# Patient Record
Sex: MALE | Race: WHITE | Employment: OTHER | URBAN - METROPOLITAN AREA
[De-identification: names, ages, dates, MRNs, and addresses within clinical notes are randomized per-mention and may not be internally consistent; named-entity substitution may affect disease eponyms.]

---

## 2017-06-26 ENCOUNTER — OFFICE VISIT (OUTPATIENT)
Dept: FAMILY MEDICINE CLINIC | Age: 57
End: 2017-06-26
Payer: COMMERCIAL

## 2017-06-26 VITALS
DIASTOLIC BLOOD PRESSURE: 76 MMHG | WEIGHT: 193.3 LBS | HEART RATE: 66 BPM | OXYGEN SATURATION: 98 % | TEMPERATURE: 97.8 F | SYSTOLIC BLOOD PRESSURE: 118 MMHG | HEIGHT: 72 IN | BODY MASS INDEX: 26.18 KG/M2

## 2017-06-26 DIAGNOSIS — Z23 NEED FOR TDAP VACCINATION: ICD-10-CM

## 2017-06-26 DIAGNOSIS — R25.1 TREMOR: ICD-10-CM

## 2017-06-26 DIAGNOSIS — Z00.00 ANNUAL PHYSICAL EXAM: Primary | ICD-10-CM

## 2017-06-26 DIAGNOSIS — Z12.5 SCREENING FOR PROSTATE CANCER: ICD-10-CM

## 2017-06-26 DIAGNOSIS — E78.5 HYPERLIPIDEMIA, UNSPECIFIED HYPERLIPIDEMIA TYPE: ICD-10-CM

## 2017-06-26 DIAGNOSIS — R05.9 COUGH: ICD-10-CM

## 2017-06-26 DIAGNOSIS — Z13.9 SCREENING: ICD-10-CM

## 2017-06-26 PROCEDURE — 99214 OFFICE O/P EST MOD 30 MIN: CPT | Performed by: PHYSICIAN ASSISTANT

## 2017-06-26 ASSESSMENT — PATIENT HEALTH QUESTIONNAIRE - PHQ9
SUM OF ALL RESPONSES TO PHQ9 QUESTIONS 1 & 2: 0
1. LITTLE INTEREST OR PLEASURE IN DOING THINGS: 0
SUM OF ALL RESPONSES TO PHQ QUESTIONS 1-9: 0
2. FEELING DOWN, DEPRESSED OR HOPELESS: 0

## 2017-06-26 ASSESSMENT — ENCOUNTER SYMPTOMS
VOMITING: 0
SINUS PRESSURE: 0
NAUSEA: 0
EYE ITCHING: 0
EYE DISCHARGE: 0
DIARRHEA: 0
RHINORRHEA: 0
SHORTNESS OF BREATH: 0
SORE THROAT: 0
ABDOMINAL PAIN: 0
COUGH: 1
CHEST TIGHTNESS: 0

## 2017-06-27 ENCOUNTER — HOSPITAL ENCOUNTER (OUTPATIENT)
Dept: GENERAL RADIOLOGY | Facility: CLINIC | Age: 57
Discharge: HOME | End: 2017-06-27
Payer: COMMERCIAL

## 2017-06-27 ENCOUNTER — HOSPITAL ENCOUNTER (OUTPATIENT)
Facility: CLINIC | Age: 57
Discharge: HOME | End: 2017-06-27
Payer: COMMERCIAL

## 2017-06-27 ENCOUNTER — HOSPITAL ENCOUNTER (OUTPATIENT)
Age: 57
Discharge: HOME | End: 2017-06-27
Payer: COMMERCIAL

## 2017-06-27 DIAGNOSIS — R05.9 COUGH: ICD-10-CM

## 2017-06-27 DIAGNOSIS — R25.1 TREMOR: ICD-10-CM

## 2017-06-27 DIAGNOSIS — Z13.9 SCREENING: ICD-10-CM

## 2017-06-27 DIAGNOSIS — Z12.5 SCREENING FOR PROSTATE CANCER: ICD-10-CM

## 2017-06-27 DIAGNOSIS — Z00.00 ANNUAL PHYSICAL EXAM: ICD-10-CM

## 2017-06-27 DIAGNOSIS — E78.5 HYPERLIPIDEMIA, UNSPECIFIED HYPERLIPIDEMIA TYPE: ICD-10-CM

## 2017-06-27 LAB
ABSOLUTE EOS #: 0.1 K/UL (ref 0–0.4)
ABSOLUTE LYMPH #: 1.3 K/UL (ref 1–4.8)
ABSOLUTE MONO #: 0.3 K/UL (ref 0.1–1.2)
ALBUMIN SERPL-MCNC: 4.6 G/DL (ref 3.5–5.2)
ALBUMIN/GLOBULIN RATIO: 2 (ref 1–2.5)
ALP BLD-CCNC: 53 U/L (ref 40–129)
ALT SERPL-CCNC: 23 U/L (ref 5–41)
ANION GAP SERPL CALCULATED.3IONS-SCNC: 13 MMOL/L (ref 9–17)
AST SERPL-CCNC: 23 U/L
BASOPHILS # BLD: 0 %
BASOPHILS ABSOLUTE: 0 K/UL (ref 0–0.2)
BILIRUB SERPL-MCNC: 1.4 MG/DL (ref 0.3–1.2)
BUN BLDV-MCNC: 16 MG/DL (ref 6–20)
BUN/CREAT BLD: ABNORMAL (ref 9–20)
C-REACTIVE PROTEIN: 0.6 MG/L (ref 0–5)
CALCIUM SERPL-MCNC: 9.2 MG/DL (ref 8.6–10.4)
CHLORIDE BLD-SCNC: 104 MMOL/L (ref 98–107)
CHOLESTEROL/HDL RATIO: 2.8
CHOLESTEROL: 201 MG/DL
CO2: 24 MMOL/L (ref 20–31)
CREAT SERPL-MCNC: 1.03 MG/DL (ref 0.7–1.2)
DIFFERENTIAL TYPE: NORMAL
EOSINOPHILS RELATIVE PERCENT: 2 %
GFR AFRICAN AMERICAN: >60 ML/MIN
GFR NON-AFRICAN AMERICAN: >60 ML/MIN
GFR SERPL CREATININE-BSD FRML MDRD: ABNORMAL ML/MIN/{1.73_M2}
GFR SERPL CREATININE-BSD FRML MDRD: ABNORMAL ML/MIN/{1.73_M2}
GLUCOSE BLD-MCNC: 105 MG/DL (ref 70–99)
HCT VFR BLD CALC: 43.7 % (ref 41–53)
HDLC SERPL-MCNC: 73 MG/DL
HEMOGLOBIN: 15.1 G/DL (ref 13.5–17.5)
HEPATITIS C ANTIBODY: NONREACTIVE
HIV AG/AB: NONREACTIVE
LDL CHOLESTEROL: 110 MG/DL (ref 0–130)
LYMPHOCYTES # BLD: 28 %
MCH RBC QN AUTO: 30.8 PG (ref 26–34)
MCHC RBC AUTO-ENTMCNC: 34.5 G/DL (ref 31–37)
MCV RBC AUTO: 89.3 FL (ref 80–100)
MONOCYTES # BLD: 6 %
PDW BLD-RTO: 13.8 % (ref 12.5–15.4)
PLATELET # BLD: 225 K/UL (ref 140–450)
PLATELET ESTIMATE: NORMAL
PMV BLD AUTO: 8.5 FL (ref 6–12)
POTASSIUM SERPL-SCNC: 4.3 MMOL/L (ref 3.7–5.3)
PROSTATE SPECIFIC ANTIGEN: 0.76 UG/L
RBC # BLD: 4.89 M/UL (ref 4.5–5.9)
RBC # BLD: NORMAL 10*6/UL
SEDIMENTATION RATE, ERYTHROCYTE: 1 MM (ref 0–10)
SEG NEUTROPHILS: 64 %
SEGMENTED NEUTROPHILS ABSOLUTE COUNT: 2.8 K/UL (ref 1.8–7.7)
SODIUM BLD-SCNC: 141 MMOL/L (ref 135–144)
TOTAL PROTEIN: 6.9 G/DL (ref 6.4–8.3)
TRIGL SERPL-MCNC: 90 MG/DL
TSH SERPL DL<=0.05 MIU/L-ACNC: 1.59 MIU/L (ref 0.3–5)
VITAMIN D 25-HYDROXY: 38.8 NG/ML (ref 30–100)
VLDLC SERPL CALC-MCNC: ABNORMAL MG/DL (ref 1–30)
WBC # BLD: 4.4 K/UL (ref 3.5–11)
WBC # BLD: NORMAL 10*3/UL

## 2017-06-27 PROCEDURE — 85025 COMPLETE CBC W/AUTO DIFF WBC: CPT

## 2017-06-27 PROCEDURE — 80061 LIPID PANEL: CPT

## 2017-06-27 PROCEDURE — 86140 C-REACTIVE PROTEIN: CPT

## 2017-06-27 PROCEDURE — 36415 COLL VENOUS BLD VENIPUNCTURE: CPT

## 2017-06-27 PROCEDURE — 71020 XR CHEST STANDARD TWO VW: CPT

## 2017-06-27 PROCEDURE — 80053 COMPREHEN METABOLIC PANEL: CPT

## 2017-06-27 PROCEDURE — 85651 RBC SED RATE NONAUTOMATED: CPT

## 2017-06-27 PROCEDURE — 82306 VITAMIN D 25 HYDROXY: CPT

## 2017-06-27 PROCEDURE — 87389 HIV-1 AG W/HIV-1&-2 AB AG IA: CPT

## 2017-06-27 PROCEDURE — G0103 PSA SCREENING: HCPCS

## 2017-06-27 PROCEDURE — 84443 ASSAY THYROID STIM HORMONE: CPT

## 2017-06-27 PROCEDURE — 86803 HEPATITIS C AB TEST: CPT

## 2017-07-03 ENCOUNTER — HOSPITAL ENCOUNTER (OUTPATIENT)
Dept: MRI IMAGING | Facility: CLINIC | Age: 57
Discharge: HOME | End: 2017-07-03
Payer: COMMERCIAL

## 2017-07-03 DIAGNOSIS — R25.1 TREMOR: ICD-10-CM

## 2017-07-03 PROCEDURE — 70551 MRI BRAIN STEM W/O DYE: CPT

## 2017-07-17 DIAGNOSIS — R25.1 TREMOR: Primary | ICD-10-CM

## 2018-04-30 ENCOUNTER — OFFICE VISIT (OUTPATIENT)
Dept: FAMILY MEDICINE CLINIC | Age: 58
End: 2018-04-30
Payer: COMMERCIAL

## 2018-04-30 ENCOUNTER — HOSPITAL ENCOUNTER (OUTPATIENT)
Age: 58
Setting detail: SPECIMEN
Discharge: HOME | End: 2018-04-30
Payer: COMMERCIAL

## 2018-04-30 VITALS
DIASTOLIC BLOOD PRESSURE: 74 MMHG | SYSTOLIC BLOOD PRESSURE: 118 MMHG | TEMPERATURE: 98 F | BODY MASS INDEX: 26.24 KG/M2 | HEIGHT: 72 IN | WEIGHT: 193.7 LBS | HEART RATE: 70 BPM | OXYGEN SATURATION: 98 %

## 2018-04-30 DIAGNOSIS — Z13.1 ENCOUNTER FOR SCREENING FOR DIABETES MELLITUS: ICD-10-CM

## 2018-04-30 DIAGNOSIS — R73.9 HYPERGLYCEMIA: ICD-10-CM

## 2018-04-30 DIAGNOSIS — R39.12 WEAK URINARY STREAM: ICD-10-CM

## 2018-04-30 DIAGNOSIS — Z23 NEED FOR PROPHYLACTIC VACCINATION AND INOCULATION AGAINST VARICELLA: ICD-10-CM

## 2018-04-30 DIAGNOSIS — E78.5 HYPERLIPIDEMIA, UNSPECIFIED HYPERLIPIDEMIA TYPE: Primary | ICD-10-CM

## 2018-04-30 DIAGNOSIS — R25.1 TREMOR: ICD-10-CM

## 2018-04-30 DIAGNOSIS — Z12.5 SCREENING FOR PROSTATE CANCER: ICD-10-CM

## 2018-04-30 DIAGNOSIS — R32 URINARY INCONTINENCE, UNSPECIFIED TYPE: ICD-10-CM

## 2018-04-30 LAB
BILIRUBIN, POC: NORMAL
BLOOD URINE, POC: NORMAL
CLARITY, POC: CLEAR
COLOR, POC: YELLOW
GLUCOSE URINE, POC: NORMAL
HBA1C MFR BLD: 5.3 %
KETONES, POC: NORMAL
LEUKOCYTE EST, POC: NORMAL
NITRITE, POC: NORMAL
PH, POC: 6
PROTEIN, POC: NORMAL
SPECIFIC GRAVITY, POC: 1.01
UROBILINOGEN, POC: 0.2

## 2018-04-30 PROCEDURE — 81003 URINALYSIS AUTO W/O SCOPE: CPT | Performed by: PHYSICIAN ASSISTANT

## 2018-04-30 PROCEDURE — 83036 HEMOGLOBIN GLYCOSYLATED A1C: CPT | Performed by: PHYSICIAN ASSISTANT

## 2018-04-30 PROCEDURE — 99214 OFFICE O/P EST MOD 30 MIN: CPT | Performed by: PHYSICIAN ASSISTANT

## 2018-04-30 ASSESSMENT — ENCOUNTER SYMPTOMS
EYE ITCHING: 0
NAUSEA: 0
VOMITING: 0
DIARRHEA: 0
HEARTBURN: 0
SHORTNESS OF BREATH: 0
HEMOPTYSIS: 0
COUGH: 1
EYE DISCHARGE: 0
RHINORRHEA: 0
SINUS PRESSURE: 0
ABDOMINAL PAIN: 0
CHEST TIGHTNESS: 0
SORE THROAT: 0

## 2018-04-30 ASSESSMENT — PATIENT HEALTH QUESTIONNAIRE - PHQ9
2. FEELING DOWN, DEPRESSED OR HOPELESS: 0
SUM OF ALL RESPONSES TO PHQ9 QUESTIONS 1 & 2: 0
SUM OF ALL RESPONSES TO PHQ QUESTIONS 1-9: 0
1. LITTLE INTEREST OR PLEASURE IN DOING THINGS: 0

## 2018-05-01 LAB
CULTURE: NO GROWTH
CULTURE: NORMAL
Lab: NORMAL
SPECIMEN DESCRIPTION: NORMAL
STATUS: NORMAL

## 2018-09-17 ENCOUNTER — HOSPITAL ENCOUNTER (OUTPATIENT)
Facility: CLINIC | Age: 58
Discharge: HOME | End: 2018-09-17
Payer: COMMERCIAL

## 2018-09-17 DIAGNOSIS — R25.1 TREMOR: ICD-10-CM

## 2018-09-17 DIAGNOSIS — R73.9 HYPERGLYCEMIA: ICD-10-CM

## 2018-09-17 DIAGNOSIS — E78.5 HYPERLIPIDEMIA, UNSPECIFIED HYPERLIPIDEMIA TYPE: ICD-10-CM

## 2018-09-17 DIAGNOSIS — Z12.5 SCREENING FOR PROSTATE CANCER: ICD-10-CM

## 2018-09-17 LAB
ABSOLUTE EOS #: 0.12 K/UL (ref 0–0.44)
ABSOLUTE IMMATURE GRANULOCYTE: <0.03 K/UL (ref 0–0.3)
ABSOLUTE LYMPH #: 1.35 K/UL (ref 1.1–3.7)
ABSOLUTE MONO #: 0.39 K/UL (ref 0.1–1.2)
ALBUMIN SERPL-MCNC: 4.3 G/DL (ref 3.5–5.2)
ALBUMIN/GLOBULIN RATIO: 1.8 (ref 1–2.5)
ALP BLD-CCNC: 50 U/L (ref 40–129)
ALT SERPL-CCNC: 18 U/L (ref 5–41)
ANION GAP SERPL CALCULATED.3IONS-SCNC: 10 MMOL/L (ref 9–17)
AST SERPL-CCNC: 20 U/L
BASOPHILS # BLD: 1 % (ref 0–2)
BASOPHILS ABSOLUTE: 0.03 K/UL (ref 0–0.2)
BILIRUB SERPL-MCNC: 1.17 MG/DL (ref 0.3–1.2)
BUN BLDV-MCNC: 21 MG/DL (ref 6–20)
BUN/CREAT BLD: ABNORMAL (ref 9–20)
CALCIUM SERPL-MCNC: 9 MG/DL (ref 8.6–10.4)
CHLORIDE BLD-SCNC: 104 MMOL/L (ref 98–107)
CHOLESTEROL/HDL RATIO: 3.2
CHOLESTEROL: 198 MG/DL
CO2: 26 MMOL/L (ref 20–31)
CREAT SERPL-MCNC: 0.96 MG/DL (ref 0.7–1.2)
DIFFERENTIAL TYPE: NORMAL
EOSINOPHILS RELATIVE PERCENT: 3 % (ref 1–4)
ESTIMATED AVERAGE GLUCOSE: 114 MG/DL
GFR AFRICAN AMERICAN: >60 ML/MIN
GFR NON-AFRICAN AMERICAN: >60 ML/MIN
GFR SERPL CREATININE-BSD FRML MDRD: ABNORMAL ML/MIN/{1.73_M2}
GFR SERPL CREATININE-BSD FRML MDRD: ABNORMAL ML/MIN/{1.73_M2}
GLUCOSE BLD-MCNC: 100 MG/DL (ref 70–99)
HBA1C MFR BLD: 5.6 % (ref 4–6)
HCT VFR BLD CALC: 44.7 % (ref 40.7–50.3)
HDLC SERPL-MCNC: 61 MG/DL
HEMOGLOBIN: 14.8 G/DL (ref 13–17)
IMMATURE GRANULOCYTES: 0 %
INSULIN COMMENT: NORMAL
INSULIN REFERENCE RANGE:: NORMAL
INSULIN: 10.1 MU/L
LDL CHOLESTEROL: 108 MG/DL (ref 0–130)
LYMPHOCYTES # BLD: 30 % (ref 24–43)
MCH RBC QN AUTO: 30.6 PG (ref 25.2–33.5)
MCHC RBC AUTO-ENTMCNC: 33.1 G/DL (ref 28.4–34.8)
MCV RBC AUTO: 92.4 FL (ref 82.6–102.9)
MONOCYTES # BLD: 9 % (ref 3–12)
NRBC AUTOMATED: 0 PER 100 WBC
PDW BLD-RTO: 12.2 % (ref 11.8–14.4)
PLATELET # BLD: 230 K/UL (ref 138–453)
PLATELET ESTIMATE: NORMAL
PMV BLD AUTO: 10 FL (ref 8.1–13.5)
POTASSIUM SERPL-SCNC: 4.1 MMOL/L (ref 3.7–5.3)
PROSTATE SPECIFIC ANTIGEN: 0.72 UG/L
RBC # BLD: 4.84 M/UL (ref 4.21–5.77)
RBC # BLD: NORMAL 10*6/UL
SEG NEUTROPHILS: 57 % (ref 36–65)
SEGMENTED NEUTROPHILS ABSOLUTE COUNT: 2.64 K/UL (ref 1.5–8.1)
SODIUM BLD-SCNC: 140 MMOL/L (ref 135–144)
TOTAL PROTEIN: 6.7 G/DL (ref 6.4–8.3)
TRIGL SERPL-MCNC: 144 MG/DL
VLDLC SERPL CALC-MCNC: NORMAL MG/DL (ref 1–30)
WBC # BLD: 4.5 K/UL (ref 3.5–11.3)
WBC # BLD: NORMAL 10*3/UL

## 2018-09-17 PROCEDURE — 36415 COLL VENOUS BLD VENIPUNCTURE: CPT

## 2018-09-17 PROCEDURE — 83036 HEMOGLOBIN GLYCOSYLATED A1C: CPT

## 2018-09-17 PROCEDURE — 80061 LIPID PANEL: CPT

## 2018-09-17 PROCEDURE — 85025 COMPLETE CBC W/AUTO DIFF WBC: CPT

## 2018-09-17 PROCEDURE — 83525 ASSAY OF INSULIN: CPT

## 2018-09-17 PROCEDURE — 80053 COMPREHEN METABOLIC PANEL: CPT

## 2018-09-17 PROCEDURE — G0103 PSA SCREENING: HCPCS

## 2018-11-30 ENCOUNTER — OFFICE VISIT (OUTPATIENT)
Dept: FAMILY MEDICINE CLINIC | Age: 58
End: 2018-11-30
Payer: COMMERCIAL

## 2018-11-30 VITALS
DIASTOLIC BLOOD PRESSURE: 75 MMHG | SYSTOLIC BLOOD PRESSURE: 117 MMHG | TEMPERATURE: 98.5 F | WEIGHT: 179 LBS | OXYGEN SATURATION: 97 % | BODY MASS INDEX: 24.24 KG/M2 | HEART RATE: 68 BPM | HEIGHT: 72 IN

## 2018-11-30 DIAGNOSIS — R25.1 TREMOR: ICD-10-CM

## 2018-11-30 DIAGNOSIS — R73.9 HYPERGLYCEMIA: ICD-10-CM

## 2018-11-30 DIAGNOSIS — Z00.00 ANNUAL PHYSICAL EXAM: Primary | ICD-10-CM

## 2018-11-30 DIAGNOSIS — Z13.21 ENCOUNTER FOR VITAMIN DEFICIENCY SCREENING: ICD-10-CM

## 2018-11-30 DIAGNOSIS — E78.5 HYPERLIPIDEMIA, UNSPECIFIED HYPERLIPIDEMIA TYPE: ICD-10-CM

## 2018-11-30 DIAGNOSIS — Z23 NEED FOR INFLUENZA VACCINATION: ICD-10-CM

## 2018-11-30 DIAGNOSIS — Z12.5 SCREENING FOR PROSTATE CANCER: ICD-10-CM

## 2018-11-30 DIAGNOSIS — G20 PARKINSON DISEASE (HCC): ICD-10-CM

## 2018-11-30 DIAGNOSIS — E53.8 B12 DEFICIENCY: ICD-10-CM

## 2018-11-30 PROCEDURE — 99396 PREV VISIT EST AGE 40-64: CPT | Performed by: PHYSICIAN ASSISTANT

## 2018-11-30 PROCEDURE — 90471 IMMUNIZATION ADMIN: CPT | Performed by: PHYSICIAN ASSISTANT

## 2018-11-30 PROCEDURE — 90688 IIV4 VACCINE SPLT 0.5 ML IM: CPT | Performed by: PHYSICIAN ASSISTANT

## 2018-11-30 RX ORDER — OMEGA-3 FATTY ACIDS/FISH OIL 300-1000MG
CAPSULE ORAL
COMMUNITY

## 2018-11-30 RX ORDER — AMANTADINE HYDROCHLORIDE 100 MG/1
CAPSULE, GELATIN COATED ORAL
COMMUNITY
Start: 2018-11-08 | End: 2021-02-04

## 2018-11-30 ASSESSMENT — ENCOUNTER SYMPTOMS
EYE ITCHING: 0
SINUS PRESSURE: 0
EYE DISCHARGE: 0
CHEST TIGHTNESS: 0
ABDOMINAL PAIN: 0
NAUSEA: 0
VOMITING: 0
DIARRHEA: 0

## 2018-11-30 NOTE — PROGRESS NOTES
[id number]  [address] [facility] [address]  Dept: [phone]  Dept Fax: [phone]    Juan Padilla is a 66 y.o. male who presents today for his medical conditions/complaints as noted below. Juan Padilla is c/o of   Chief Complaint   Patient presents with    Hyperlipidemia    Discuss Labs         HPI:     Hyperlipidemia   He has no history of chronic renal disease, diabetes, hypothyroidism, liver disease or nephrotic syndrome. Pertinent negatives include no focal weakness. Hemoglobin A1C (%)   Date Value   09/17/2018 5.6   04/30/2018 5.3             ( goal A1C is < 7)   No results found for: LABMICR  LDL Cholesterol (mg/dL)   Date Value   09/17/2018 108   06/27/2017 110   12/11/2015 118       (goal LDL is <100)   AST (U/L)   Date Value   09/17/2018 20     ALT (U/L)   Date Value   09/17/2018 18     BUN (mg/dL)   Date Value   09/17/2018 21 (H)     BP Readings from Last 3 Encounters:   11/30/18 117/75   04/30/18 118/74   06/26/17 118/76          (goal 120/80)    No past medical history on file. Past Surgical History:   Procedure Laterality Date    HERNIA REPAIR      VASECTOMY         Family History   Problem Relation Age of Onset    Other Mother     Heart Disease Father        Social History   Substance Use Topics    Smoking status: Never Smoker    Smokeless tobacco: Former User    Alcohol use 0.0 oz/week      Comment: social      Current Outpatient Prescriptions   Medication Sig Dispense Refill    ibuprofen (ADVIL;MOTRIN) 200 MG CAPS 2 tablets as needed      amantadine (SYMMETREL) 100 MG capsule 1 capsule in the morning      zoster recombinant adjuvanted vaccine (SHINGRIX) 50 MCG/0.5ML SUSR injection Inject 0.5 mLs into the muscle once for 1 dose 0.5 mL 0     No current facility-administered medications for this visit.       No Known Allergies    Health Maintenance   Topic Date Due    Shingles Vaccine (1 of 2 - 2 Dose Series)

## 2020-02-04 ENCOUNTER — OFFICE VISIT (OUTPATIENT)
Dept: DERMATOLOGY | Age: 60
End: 2020-02-04
Payer: COMMERCIAL

## 2020-02-04 VITALS
HEIGHT: 72 IN | SYSTOLIC BLOOD PRESSURE: 125 MMHG | BODY MASS INDEX: 24.92 KG/M2 | HEART RATE: 76 BPM | OXYGEN SATURATION: 97 % | DIASTOLIC BLOOD PRESSURE: 83 MMHG | WEIGHT: 184 LBS

## 2020-02-04 PROCEDURE — 17003 DESTRUCT PREMALG LES 2-14: CPT | Performed by: DERMATOLOGY

## 2020-02-04 PROCEDURE — 17000 DESTRUCT PREMALG LESION: CPT | Performed by: DERMATOLOGY

## 2020-02-04 PROCEDURE — 99202 OFFICE O/P NEW SF 15 MIN: CPT | Performed by: DERMATOLOGY

## 2020-02-04 RX ORDER — AMANTADINE HYDROCHLORIDE 100 MG/1
200 CAPSULE, GELATIN COATED ORAL 2 TIMES DAILY
COMMUNITY
Start: 2019-05-14

## 2020-02-04 RX ORDER — RASAGILINE 0.5 MG/1
1 TABLET ORAL DAILY
COMMUNITY
Start: 2019-12-02

## 2020-02-04 NOTE — PATIENT INSTRUCTIONS
Cryotherapy    Liquid Nitrogen - \"freeze\" (Cryotherapy)  Your doctor has treated your skin lesions with a very cold substance. The liquid nitrogen is so cold that it may feel like the skin is burning during application. A clear blister or blood blister may form after treatment and may later form a scab. Leave the area alone. Usually this scab will fall of within 1-2 weeks. The area should be kept clean and can be covered with Vaseline and a Band-Aid if needed. If a large blister develops it is ok to use a clean needle to gently pop the blister. Please call our office with any concerns at [phone]. Sun Protection     There are two types of sun rays that are harmful to the skin. UVA rays cause skin aging and skin cancer, such as melanoma. UVB rays cause sunburns, cataracts, and also contribute to skin cancer. The American-Academy of Dermatology recommends that children and adults wear a broad spectrum, waterproof sunscreen with a Sun Protection Factor (SPF) of 30 or higher. It is important to check the ingredient label to be sure the sunscreen will protect the skin from both UVA and UVB sunrays. Your sunscreen should contain at least one of the following ingredients: titanium dioxide, zinc oxide, or avobenzone. Sunscreen will not be effective unless it is applied to all exposed skin. Sunscreens work best if they are applied 30 minutes before sun exposure. They should be reapplied every 2 hours and after any water exposure. Sunscreen is not perfect. It is important to use other methods to protect the skin from sun exposure also. Wear hats, sunglasses and other sun protective clothing when outdoors.   Stay in the shade during the peak hours of sun exposure between 10 AM and 4 PM.    -Follow up: one year

## 2020-02-04 NOTE — PROGRESS NOTES
Dermatology Patient Note  [facility] PHYSICIANS  [facility] HEALTH DERMATOLOGY  [address] [address]  Dept: [phone]  Dept Fax: [phone]: 2/4/2020   REFERRING PROVIDER: No ref. provider found      Dariel Galdamez is a 64 y.o. male  who presents today in the office for:    New Patient (Lesion on right cheek. Have had about 6 months.)      HISTORY OF PRESENT ILLNESS:  64 y.o. male presenting for lesion  Location: right cheek  Duration: months  Symptoms: sore  Course: perisstent  Exacerbating factors: none  Prior treatments: none      CURRENT MEDICATIONS:   Current Outpatient Medications   Medication Sig Dispense Refill    carbidopa-levodopa (SINEMET)  MG per tablet Take 1 tablet by mouth every morning      Carbidopa-Levodopa ER (RYTARY) 23.75-95 MG CPCR Take 1 capsule by mouth 3 times daily      Carbidopa-Levodopa ER (RYTARY) 36. MG CPCR Take 1 capsule by mouth 3 times daily      rasagiline (AZILECT) 0.5 MG TABS Take 1 mg by mouth daily      amantadine (SYMMETREL) 100 MG capsule Take 200 mg by mouth 2 times daily      ibuprofen (ADVIL;MOTRIN) 200 MG CAPS 2 tablets as needed      amantadine (SYMMETREL) 100 MG capsule 1 capsule in the morning       No current facility-administered medications for this visit. ALLERGIES:   No Known Allergies    SOCIAL HISTORY:  Social History     Tobacco Use    Smoking status: Never Smoker    Smokeless tobacco: Former User   Substance Use Topics    Alcohol use: Yes     Alcohol/week: 0.0 standard drinks     Comment: social       REVIEW OF SYSTEMS:  Review of Systems  Skin: Denies any new changing, growing orbleeding lesions or rashes except as described in the HPI   Constitutional: Denies fevers, chills, and malaise.     PHYSICAL EXAM:   /83 (Site: Right Upper Arm, Position: Sitting, Cuff Size: Medium Adult)   Pulse 76   Ht 6' (1.829 m)   Wt 184 lb (83.5 kg)   SpO2 97%   BMI 24.95 kg/m²     General Patient Instructions   Cryotherapy    Liquid Nitrogen - \"freeze\" (Cryotherapy)  Your doctor has treated your skin lesions with a very cold substance. The liquid nitrogen is so cold that it may feel like the skin is burning during application. A clear blister or blood blister may form after treatment and may later form a scab. Leave the area alone. Usually this scab will fall of within 1-2 weeks. The area should be kept clean and can be covered with Vaseline and a Band-Aid if needed. If a large blister develops it is ok to use a clean needle to gently pop the blister. Please call our office with any concerns at [phone]. Sun Protection     There are two types of sun rays that are harmful to the skin. UVA rays cause skin aging and skin cancer, such as melanoma. UVB rays cause sunburns, cataracts, and also contribute to skin cancer. The American-Academy of Dermatology recommends that children and adults wear a broad spectrum, waterproof sunscreen with a Sun Protection Factor (SPF) of 30 or higher. It is important to check the ingredient label to be sure the sunscreen will protect the skin from both UVA and UVB sunrays. Your sunscreen should contain at least one of the following ingredients: titanium dioxide, zinc oxide, or avobenzone. Sunscreen will not be effective unless it is applied to all exposed skin. Sunscreens work best if they are applied 30 minutes before sun exposure. They should be reapplied every 2 hours and after any water exposure. Sunscreen is not perfect. It is important to use other methods to protect the skin from sun exposure also. Wear hats, sunglasses and other sun protective clothing when outdoors. Stay in the shade during the peak hours of sun exposure between 10 AM and 4 PM.    -Follow up: one year      Follow-up: No follow-ups on file.       This note was created with the assistance of a speech-recognition program.  Although the intention is to generate a

## 2021-01-22 ENCOUNTER — HOSPITAL ENCOUNTER (OUTPATIENT)
Facility: CLINIC | Age: 61
Discharge: HOME | End: 2021-01-24
Payer: COMMERCIAL

## 2021-01-22 ENCOUNTER — HOSPITAL ENCOUNTER (OUTPATIENT)
Dept: GENERAL RADIOLOGY | Facility: CLINIC | Age: 61
Discharge: HOME | End: 2021-01-24
Payer: COMMERCIAL

## 2021-01-22 ENCOUNTER — HOSPITAL ENCOUNTER (OUTPATIENT)
Age: 61
Setting detail: SPECIMEN
Discharge: HOME | End: 2021-01-22
Payer: COMMERCIAL

## 2021-01-22 DIAGNOSIS — M25.512 LEFT SHOULDER PAIN, UNSPECIFIED CHRONICITY: ICD-10-CM

## 2021-01-22 DIAGNOSIS — M54.2 CERVICALGIA: ICD-10-CM

## 2021-01-22 LAB
ALBUMIN SERPL-MCNC: 4.6 G/DL (ref 3.5–5.2)
ALBUMIN/GLOBULIN RATIO: 2.1 (ref 1–2.5)
ALP BLD-CCNC: 68 U/L (ref 40–129)
ALT SERPL-CCNC: <5 U/L (ref 5–41)
ANION GAP SERPL CALCULATED.3IONS-SCNC: 6 MMOL/L (ref 9–17)
AST SERPL-CCNC: 17 U/L
BILIRUB SERPL-MCNC: 1.21 MG/DL (ref 0.3–1.2)
BUN BLDV-MCNC: 19 MG/DL (ref 6–20)
BUN/CREAT BLD: ABNORMAL (ref 9–20)
CALCIUM SERPL-MCNC: 9.4 MG/DL (ref 8.6–10.4)
CHLORIDE BLD-SCNC: 103 MMOL/L (ref 98–107)
CHOLESTEROL/HDL RATIO: 2.6
CHOLESTEROL: 197 MG/DL
CO2: 31 MMOL/L (ref 20–31)
CREAT SERPL-MCNC: 1.05 MG/DL (ref 0.7–1.2)
ESTIMATED AVERAGE GLUCOSE: 103 MG/DL
GFR AFRICAN AMERICAN: >60 ML/MIN
GFR NON-AFRICAN AMERICAN: >60 ML/MIN
GFR SERPL CREATININE-BSD FRML MDRD: ABNORMAL ML/MIN/{1.73_M2}
GFR SERPL CREATININE-BSD FRML MDRD: ABNORMAL ML/MIN/{1.73_M2}
GLUCOSE BLD-MCNC: 93 MG/DL (ref 70–99)
HBA1C MFR BLD: 5.2 % (ref 4–6)
HCT VFR BLD CALC: 45.9 % (ref 40.7–50.3)
HDLC SERPL-MCNC: 75 MG/DL
HEMOGLOBIN: 15 G/DL (ref 13–17)
IRON: 160 UG/DL (ref 59–158)
LDL CHOLESTEROL: 106 MG/DL (ref 0–130)
MAGNESIUM: 2.4 MG/DL (ref 1.6–2.6)
MCH RBC QN AUTO: 30.5 PG (ref 25.2–33.5)
MCHC RBC AUTO-ENTMCNC: 32.7 G/DL (ref 28.4–34.8)
MCV RBC AUTO: 93.3 FL (ref 82.6–102.9)
NRBC AUTOMATED: 0 PER 100 WBC
PDW BLD-RTO: 12.1 % (ref 11.8–14.4)
PLATELET # BLD: 261 K/UL (ref 138–453)
PMV BLD AUTO: 10.1 FL (ref 8.1–13.5)
POTASSIUM SERPL-SCNC: 4.1 MMOL/L (ref 3.7–5.3)
PROSTATE SPECIFIC ANTIGEN: 0.58 UG/L
RBC # BLD: 4.92 M/UL (ref 4.21–5.77)
SODIUM BLD-SCNC: 140 MMOL/L (ref 135–144)
TOTAL PROTEIN: 6.8 G/DL (ref 6.4–8.3)
TRIGL SERPL-MCNC: 82 MG/DL
TSH SERPL DL<=0.05 MIU/L-ACNC: 1.34 MIU/L (ref 0.3–5)
VITAMIN B-12: 557 PG/ML (ref 232–1245)
VITAMIN D 25-HYDROXY: 35.8 NG/ML (ref 30–100)
VLDLC SERPL CALC-MCNC: NORMAL MG/DL (ref 1–30)
WBC # BLD: 4.8 K/UL (ref 3.5–11.3)

## 2021-01-22 PROCEDURE — 73030 X-RAY EXAM OF SHOULDER: CPT

## 2021-01-22 PROCEDURE — 72040 X-RAY EXAM NECK SPINE 2-3 VW: CPT

## 2021-01-28 ENCOUNTER — HOSPITAL ENCOUNTER (OUTPATIENT)
Dept: GENERAL RADIOLOGY | Facility: CLINIC | Age: 61
Discharge: HOME | End: 2021-01-30
Payer: COMMERCIAL

## 2021-01-28 DIAGNOSIS — M79.5 FOREIGN BODY (FB) IN SOFT TISSUE: ICD-10-CM

## 2021-01-28 PROCEDURE — 70030 X-RAY EYE FOR FOREIGN BODY: CPT

## 2021-02-01 ENCOUNTER — HOSPITAL ENCOUNTER (OUTPATIENT)
Dept: MRI IMAGING | Facility: CLINIC | Age: 61
Discharge: HOME | End: 2021-02-03
Payer: COMMERCIAL

## 2021-02-01 DIAGNOSIS — M54.2 CERVICALGIA: ICD-10-CM

## 2021-02-01 PROCEDURE — 72141 MRI NECK SPINE W/O DYE: CPT

## 2021-02-04 ENCOUNTER — OFFICE VISIT (OUTPATIENT)
Dept: DERMATOLOGY | Age: 61
End: 2021-02-04
Payer: COMMERCIAL

## 2021-02-04 VITALS
WEIGHT: 185.8 LBS | DIASTOLIC BLOOD PRESSURE: 79 MMHG | HEART RATE: 70 BPM | BODY MASS INDEX: 25.17 KG/M2 | HEIGHT: 72 IN | SYSTOLIC BLOOD PRESSURE: 116 MMHG | OXYGEN SATURATION: 98 % | TEMPERATURE: 97.1 F

## 2021-02-04 DIAGNOSIS — L82.0 INFLAMED SEBORRHEIC KERATOSIS: Primary | ICD-10-CM

## 2021-02-04 DIAGNOSIS — L82.1 SEBORRHEIC KERATOSES: ICD-10-CM

## 2021-02-04 DIAGNOSIS — L57.8 ACTINIC SKIN DAMAGE: ICD-10-CM

## 2021-02-04 DIAGNOSIS — D22.9 MULTIPLE NEVI: ICD-10-CM

## 2021-02-04 DIAGNOSIS — L21.9 SEBORRHEIC DERMATITIS: ICD-10-CM

## 2021-02-04 DIAGNOSIS — L30.4 INTERTRIGO: ICD-10-CM

## 2021-02-04 PROCEDURE — 17110 DESTRUCTION B9 LES UP TO 14: CPT | Performed by: DERMATOLOGY

## 2021-02-04 PROCEDURE — 99213 OFFICE O/P EST LOW 20 MIN: CPT | Performed by: DERMATOLOGY

## 2021-02-04 RX ORDER — KETOCONAZOLE 20 MG/ML
SHAMPOO TOPICAL
Qty: 120 ML | Refills: 2 | Status: SHIPPED | OUTPATIENT
Start: 2021-02-04

## 2021-02-04 RX ORDER — KETOCONAZOLE 20 MG/G
CREAM TOPICAL
Qty: 30 G | Refills: 11 | Status: SHIPPED | OUTPATIENT
Start: 2021-02-04

## 2021-02-04 NOTE — PATIENT INSTRUCTIONS
- Ketoconazole shampoo 3-4 times weekly; leave on scalp for 5 minutes prior to rinsing out  - Ketoconazole cream to dry, scaly patches on face/scalp. Can be applied to buttocks irritation as well  - Hydrocortisone cream to dry, scaly patches on face/scalp. Alternate with ketoconazole cream or mix 1:1 with ketoconazole cream and apply to affected areas  - Follow up in 1 year, sooner if needed    Cryotherapy    Liquid Nitrogen - \"freeze\" (Cryotherapy)  Your doctor has treated your skin lesions with a very cold substance. The liquid nitrogen is so cold that it may feel like the skin is burning during application. A clear blister or blood blister may form after treatment and may later form a scab. Leave the area alone. Usually this scab will fall of within 1-2 weeks. The area should be kept clean and can be covered with Vaseline and a Band-Aid if needed. If a large blister develops it is ok to use a clean needle to gently pop the blister. Please call our office with any concerns at [phone]. *Seborrheic (seb-o-REE-ik) dermatitis is a common skin condition that mainly affects your scalp. It causes scaly patches, red skin and stubborn dandruff. Seborrheic dermatitis can also affect oily areas of the body, such as the face, sides of the nose, eyebrows, ears, eyelids and chest.    Sun Protection     There are two types of sun rays that are harmful to the skin. UVA rays cause skin aging and skin cancer, such as melanoma. UVB rays cause sunburns, cataracts, and also contribute to skin cancer. The American-Academy of Dermatology recommends that children and adults wear a broad spectrum, waterproof sunscreen with a Sun Protection Factor (SPF) of 30 or higher. It is important to check the ingredient label to be sure the sunscreen will protect the skin from both UVA and UVB sunrays. Your sunscreen should contain at least one of the following ingredients: titanium dioxide, zinc oxide, or avobenzone. Sunscreen will not be effective unless it is applied to all exposed skin. Sunscreens work best if they are applied 30 minutes before sun exposure. They should be reapplied every 2 hours and after any water exposure. Sunscreen is not perfect. It is important to use other methods to protect the skin from sun exposure also. Wear hats, sunglasses and other sun protective clothing when outdoors.   Stay in the shade during the peak hours of sun exposure between 10 AM and 4 PM.

## 2021-02-04 NOTE — PROGRESS NOTES
Dermatology Patient Note  [address]  [address]  [address]  Dept: [phone]  Dept Fax: [phone]      VISITDATE: 2/4/2021   REFERRING PROVIDER: No ref. provider found      Sari Valenzuela is a 64 y.o. male  who presents today in the office for:    Follow-up (1 year FBSE-only concerning or new spot is on the lt chest. Possibly a mole)      PERTINENT HISTORY NOT LISTED ABOVE:  Patient presents for skin check  Concerned about mole on left chest that has shown up  Also has dry skin, especially of face  Irritated skin between buttocks    CURRENT MEDICATIONS:   Current Outpatient Medications   Medication Sig Dispense Refill    carbidopa-levodopa (SINEMET)  MG per tablet TAKE 1 TABLET IN THE MORNING AND ONE-HALF (1/2) TABLET OR ONE TABLET AS NEEDED PRIOR TO EXERTIONAL ACTIVITY      hydrocortisone 2.5 % cream Apply topically 2 times daily. 28 g 11    ketoconazole (NIZORAL) 2 % cream Apply to affected area BID 30 g 11    Carbidopa-Levodopa ER (RYTARY) 23.75-95 MG CPCR Take 1 capsule by mouth 3 times daily      Carbidopa-Levodopa ER (RYTARY) 36. MG CPCR Take 1 capsule by mouth 3 times daily      rasagiline (AZILECT) 0.5 MG TABS Take 1 mg by mouth daily      amantadine (SYMMETREL) 100 MG capsule Take 200 mg by mouth 2 times daily      ibuprofen (ADVIL;MOTRIN) 200 MG CAPS 2 tablets as needed       No current facility-administered medications for this visit. ALLERGIES:   No Known Allergies    SOCIAL HISTORY:  Social History     Tobacco Use    Smoking status: Never Smoker    Smokeless tobacco: Former User   Substance Use Topics    Alcohol use: Yes     Alcohol/week: 0.0 standard drinks     Comment: social       Pertinent ROS:  Review of Systems  Skin: Denies any new changing, growing or bleeding lesions or rashes except as described in the HPI   Constitutional: Denies fevers, chills, and malaise.     PHYSICAL EXAM: /79 (Site: Right Upper Arm, Position: Sitting, Cuff Size: Medium Adult)   Pulse 70   Temp 97.1 °F (36.2 °C)   Ht 6' (1.829 m)   Wt 185 lb 12.8 oz (84.3 kg)   SpO2 98%   BMI 25.20 kg/m²     The patient is generally well appearing, well nourished, alert and conversational. Affect is normal.    Cutaneous Exam:  Physical Exam  Full skin, which includes the head/face, neck, both arms, chest, back, abdomen, bothlegs, genitalia and/or groin and/or buttocks, digits and/or nails, was examined. Diagnoses/exam findings/medical history pertinent to this visit are listed below:    Assessment and Plan:  Assessment   1. Inflamed seborrheic keratosis  Cryotherapy: After verbal consent was obtained including discussion of the risks (lesion persistence, lesion recurrence and hypo/hyperpigmentation) and benefits (resolution of the lesion) 1 total Inflamed Seborrheic Keratosis on the left chest were treated once with liquid nitrogen to achieve a 2-3 mm freeze border.  - PA DESTRUCTION BENIGN LESIONS UP TO 14    2. Seborrheic dermatitis  - discussed diagnosis, etiology, natural course, and treatment options  - hydrocortisone 2.5 % cream; Apply topically 2 times daily. Dispense: 28 g; Refill: 11  - ketoconazole (NIZORAL) 2 % cream; Apply to affected area BID  Dispense: 30 g; Refill: 11  - ketoconazole shampoo 3x weekly    3. Intertrigo  - discussed diagnosis, etiology, natural course, and treatment options  - hydrocortisone 2.5 % cream; Apply topically 2 times daily. Dispense: 28 g; Refill: 11  - ketoconazole (NIZORAL) 2 % cream; Apply to affected area BID  Dispense: 30 g; Refill: 11    4. Multiple nevi  - Clinically and dermatoscopically benign on exam today. - Common nevi have a low individual risk of developing into melanoma. Patients with >50 nevi have a greater risk of developing melanoma in their lifetime and should undergo skin checks at least annually. - I recommended the patient apply broad spectrum spf 30+ sunscreen daily, reapplying every 2 hours  - In additional to regular use of sunscreen, I recommended broad-rimmed hats, long sleeves, and seeking the shade. 5. Seborrheic keratoses  reassurance and education    6. Actinic skin damage  - patient was counseled that UV-damaged skin increases lifetime risk for skin cancer  - I recommended the patient apply broad spectrum spf 30+ sunscreen daily, reapplying every 2 hours. - In additional to regular use of sunscreen, I recommended broad-rimmed hats, long sleeves, and seeking the shade. RTC 1 year    Patient Instructions   - Ketoconazole shampoo 3-4 times weekly; leave on scalp for 5 minutes prior to rinsing out  - Ketoconazole cream to dry, scaly patches on face/scalp. Can be applied to buttocks irritation as well  - Hydrocortisone cream to dry, scaly patches on face/scalp. Alternate with ketoconazole cream or mix 1:1 with ketoconazole cream and apply to affected areas  - Follow up in 1 year, sooner if needed    Cryotherapy    Liquid Nitrogen - \"freeze\" (Cryotherapy)  Your doctor has treated your skin lesions with a very cold substance. The liquid nitrogen is so cold that it may feel like the skin is burning during application. A clear blister or blood blister may form after treatment and may later form a scab. Leave the area alone. Usually this scab will fall of within 1-2 weeks. The area should be kept clean and can be covered with Vaseline and a Band-Aid if needed. If a large blister develops it is ok to use a clean needle to gently pop the blister. Please call our office with any concerns at [phone]. *Seborrheic (seb-o-REE-ik) dermatitis is a common skin condition that mainly affects your scalp. It causes scaly patches, red skin and stubborn dandruff.  Seborrheic dermatitis can also affect oily areas of the body, such as the face, sides of the nose, eyebrows, ears, eyelids and chest.

## 2022-03-17 ENCOUNTER — OFFICE VISIT (OUTPATIENT)
Dept: ORTHOPEDIC SURGERY | Age: 62
End: 2022-03-17
Payer: COMMERCIAL

## 2022-03-17 VITALS — HEIGHT: 72 IN | BODY MASS INDEX: 25.47 KG/M2 | WEIGHT: 188 LBS

## 2022-03-17 DIAGNOSIS — M25.562 LEFT KNEE PAIN, UNSPECIFIED CHRONICITY: Primary | ICD-10-CM

## 2022-03-17 DIAGNOSIS — M17.12 PRIMARY OSTEOARTHRITIS OF LEFT KNEE: ICD-10-CM

## 2022-03-17 PROCEDURE — 99203 OFFICE O/P NEW LOW 30 MIN: CPT | Performed by: STUDENT IN AN ORGANIZED HEALTH CARE EDUCATION/TRAINING PROGRAM

## 2022-03-17 NOTE — PROGRESS NOTES
[address] [facility]  [id number] [facility] [address]  Dept: [phone]    Ambulatory Orthopedic Office Visit    CHIEF COMPLAINT:    Chief Complaint   Patient presents with    Knee Pain     left     HISTORY OF PRESENT ILLNESS:      The patient is a 63 y.o.male who is being seen at the request of  Alice Alvarenga PA-C for consultation and evaluation of left knee pain patient states he has had left knee pain for approximately 2 years but states that over the past year or so it has increasingly gotten worse. He denies any previous injury or falls that exacerbated the knee pain. He states he worked as a  for most of his life and did not wearing kneepads. He states that he has occasional discomfort at night most of his discomfort is in the morning and consist of stiffness and deep achiness to the medial side. He states occasionally the pain will radiate anteriorly and laterally but is mostly isolated to the medial side. He states he needs approximately 30 minutes in the morning in order to warm up his joint. He states he does have some difficulty negotiating stairs as well as rising from a seated to a standing position when sitting for prolonged period of time. 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 He states he has tried occasional anti-inflammatory medications and Tylenol but states he does not like to take more medications than required as he has Parkinson's disease. He has not performed any formal physical therapy. Past Medical History:    No past medical history on file.     Past Surgical History:    Past Surgical History:   Procedure Laterality Date    HERNIA REPAIR      VASECTOMY       Current Medications:   Current Outpatient Medications   Medication Sig Dispense Refill    carbidopa-levodopa (SINEMET)  MG per tablet TAKE 1 TABLET IN THE MORNING AND ONE-HALF (1/2) TABLET OR ONE TABLET AS NEEDED PRIOR TO EXERTIONAL ACTIVITY      hydrocortisone 2.5 % cream Apply topically 2 times daily. 28 g 11    ketoconazole (NIZORAL) 2 % cream Apply to affected area BID 30 g 11    ketoconazole (NIZORAL) 2 % shampoo Apply 3-4 times weekly to scalp, leave on for five minutes prior to washing off 120 mL 2    Carbidopa-Levodopa ER (RYTARY) 23.75-95 MG CPCR Take 1 capsule by mouth 3 times daily      Carbidopa-Levodopa ER (RYTARY) 36. MG CPCR Take 1 capsule by mouth 3 times daily      rasagiline (AZILECT) 0.5 MG TABS Take 1 mg by mouth daily      amantadine (SYMMETREL) 100 MG capsule Take 200 mg by mouth 2 times daily      ibuprofen (ADVIL;MOTRIN) 200 MG CAPS 2 tablets as needed       No current facility-administered medications for this visit. Allergies:    Patient has no known allergies. Social History:   Social History     Socioeconomic History    Marital status:      Spouse name: Not on file    Number of children: Not on file    Years of education: Not on file    Highest education level: Not on file   Occupational History    Not on file   Tobacco Use    Smoking status: Never Smoker    Smokeless tobacco: Former User   Substance and Sexual Activity    Alcohol use: Yes     Alcohol/week: 0.0 standard drinks     Comment: social    Drug use: No    Sexual activity: Yes     Partners: Female   Other Topics Concern    Not on file   Social History Narrative    Not on file     Social Determinants of Health     Financial Resource Strain:     Difficulty of Paying Living Expenses: Not on file   Food Insecurity:     Worried About Running Out of Food in the Last Year: Not on file    Roxy of Food in the Last Year: Not on file   Transportation Needs:     Lack of Transportation (Medical): Not on file    Lack of Transportation (Non-Medical):  Not on file   Physical Activity:     Days of Exercise per Week: Not on file    Minutes of Exercise per Session: Not on file   Stress:     Feeling of Stress : Not on file   Social Connections:     Frequency of Communication with Friends and Family: Not on file    Frequency of Social Gatherings with Friends and Family: Not on file    Attends Hinduism Services: Not on file    Active Member of [address] [place] [address] or Organizations: Not on file    Attends Club or Organization Meetings: Not on file    Marital Status: Not on file   Intimate Partner Violence:     Fear of Current or Ex-Partner: Not on file    Emotionally Abused: Not on file    Physically Abused: Not on file    Sexually Abused: Not on file   Housing Stability:     Unable to Pay for Housing in the Last Year: Not on file    Number of [place] in the Last Year: Not on file    Unstable Housing in the Last Year: Not on file       Family History:  Family History   Problem Relation Age of Onset    Other Mother     Heart Disease Father          REVIEW OF SYSTEMS:    Constitutional: Negative for fever and chills. HENT: Negative for congestion or drainage   Eyes: Negative for blurred vision and double vision. Respiratory: Negative for cough, shortnessof breath and wheezing. Cardiovascular: Negative for chest pain and palpitations. Gastrointestinal: Negative for nausea. Negative for vomiting. Musculoskeletal: Positive for myalgias and joint pain left knee pain. Skin:Negative for itching and rash. Neurological: Negative for dizziness, sensory change and headaches. Psychiatric/Behavioral: Negative for depression and suicidal ideas. PHYSICAL EXAM:  Ht 6' (1.829 m)   Wt 188 lb (85.3 kg)   BMI 25.50 kg/m²   Physical Exam  Gen: alert and oriented  Psych:  Appropriate affect; Appropriate knowledge base; Appropriate mood; No hallucinations;    Head: normocephalic atraumatic   Cardiovascular:Regular rate, no dependent edema, distal pulses 2+  Respiratory: Chest symmetric, no accessory muscle use, normal respirations  Ortho Exam  LLE -mild varus deformity appreciated to the left knee. No significant erythema, swelling or effusion appreciated. Range of motion from 130 degrees to 0 degrees. Passive range of motion the same with some crepitance appreciated. Patient does have tenderness to palpation to the anterior and posterior medial lateral joint lines. Varus valgus stress tests are stable at 0 and 30 degrees. Lachman's 2A. Distal neurovascular status intact grossly. The patient ambulates with mild antalgia. Radiology:   History:   Chronic left knee pain    Comparison:   Radiographs in December 2015 available for comparison    Findings:   4 weightbearing views of the left knee including AP, lateral, tunnel, and sunrise radiographs demonstrate no acute fracture, dislocation, or osseous abnormalities. There is moderate medial joint space narrowing compared to the lateral side with subchondral sclerosis of the medial tibial plateau. There is a small amount of osteophyte formation. There is squaring and sharpening of the tibial spines. There is mild joint space narrowing and osteophyte formation of the superior and inferior poles of the patella indicative of mild patellofemoral arthritis    Impression:  Mild to moderate degenerative changes of the left knee        ASSESSMENT:     1. Left knee pain, unspecified chronicity    2. Primary osteoarthritis of left knee         PLAN:  1. Discussed the history natural etiology as well as treatment options regarding left knee pain and degenerative osteoarthritis. We discussed conservative nonconservative treatment options in the form of rest, ice, elevation, anti-inflammatory medications, physical therapy, corticosteroid injections, hyaluronic acid injections, and ultimately total knee arthroplasty.   At this time given the patient's mild symptoms with mild to moderate osteoarthritic changes on x-ray and the patient not partaking in any significant conservative treatment at this time we have recommended treatment with corticosteroid injection and continued physical therapy/home exercise programs. 2.  We discussed that corticosteroid injections can be performed every 4 months if needed or as the patient feels he needs it. 3.  Please see injection note below. We will have the patient follow-up in 6 weeks for repeat evaluation to see how he is doing after the injection. 4.  Patient is encouraged to call return the office sooner with questions or concerns. All questions were answered to his satisfaction, he is aware, understands, and voices his willingness to proceed as discussed    Injection procedure note  The alternatives, benefits, and risks were discussed with the patient. After answering all questions to the patient's satisfaction, the patient agreed to proceed forward with injection and gave verbal consent for the procedure. With the patient's permission, appropriate anatomic landmarks were identified and the left knee joint was prepped in a sterile fashion using alcohol and/or betadine. A 21 gauge needle was then used to inject 2cc 0.25% marcaine plain and 80mg depo medrol into the joint. The injection was advanced without resistance confirming appropriate position. The patient tolerated the procedure well and the site was dressed with a band-aid. Patient was advised to ice the area for 15-20 minutes to relieve any injection site related pain. Patient was advised to contact nurse if area becomes swollen, hot, erythematous, or painful, or to go to the emergency room after business hours. Return in about 6 weeks (around 4/28/2022). No orders of the defined types were placed in this encounter.       Orders Placed This Encounter   Procedures    XR KNEE LEFT (MIN 4 VIEWS)     Standing Status:   Future     Number of Occurrences:   1     Standing Expiration Date:   3/17/2023     Order Specific Question:   Reason for exam: Answer:   pain        Reviewed Subjective section with patient who did agree and confirmed everything documented. Discussed plan and patient expressed understanding of diagnosis andprognosis with plan as stated. All questions answered.      Jemma Cross DO   Orthopedic Surgery Resident PGY-5  [facility]

## 2022-04-28 ENCOUNTER — OFFICE VISIT (OUTPATIENT)
Dept: ORTHOPEDIC SURGERY | Age: 62
End: 2022-04-28
Payer: COMMERCIAL

## 2022-04-28 VITALS — HEIGHT: 72 IN | WEIGHT: 180 LBS | BODY MASS INDEX: 24.38 KG/M2

## 2022-04-28 DIAGNOSIS — M25.562 ACUTE PAIN OF LEFT KNEE: Primary | ICD-10-CM

## 2022-04-28 PROCEDURE — 99214 OFFICE O/P EST MOD 30 MIN: CPT | Performed by: STUDENT IN AN ORGANIZED HEALTH CARE EDUCATION/TRAINING PROGRAM

## 2022-04-28 RX ORDER — DICLOFENAC SODIUM 75 MG/1
75 TABLET, DELAYED RELEASE ORAL 2 TIMES DAILY
Qty: 60 TABLET | Refills: 0 | Status: SHIPPED | OUTPATIENT
Start: 2022-04-28 | End: 2022-06-03

## 2022-04-28 NOTE — PROGRESS NOTES
[address] [facility]  [id number] [facility] [address]  Dept: [address] [facility] Patient      CHIEF COMPLAINT:    Chief Complaint   Patient presents with    Knee Pain     left knee f/u today       HISTORY OF PRESENT ILLNESS:    The patient is a 63 y.o. male who is being seen as a new patient for left knee pain, although has some right knee pain as well. Patient's knee pain is chronic in nature, bothering him off and on for years. Worse when he stands for extended periods of time, particularly on concrete. States his pain is improved when he is interested in that. Does not really have any worsening pain with continued activity. Pain is worse in the evening. Has little to no pain when he wakes up in the morning. Does not have a worsening pain getting up from a chair or going up and down steps. Does not have any feelings of instability, catching, locking, clicking, or feelings of the knee getting stuck. Does not take any medications for his knee pain. He has not done any physical therapy. He has not had any surgical procedures done on    Past Medical History:    No past medical history on file. Past Surgical History:    Past Surgical History:   Procedure Laterality Date    HERNIA REPAIR      VASECTOMY         Current Medications:   Current Outpatient Medications   Medication Sig Dispense Refill    diclofenac (VOLTAREN) 75 MG EC tablet Take 1 tablet by mouth 2 times daily 60 tablet 0    carbidopa-levodopa (SINEMET)  MG per tablet TAKE 1 TABLET IN THE MORNING AND ONE-HALF (1/2) TABLET OR ONE TABLET AS NEEDED PRIOR TO EXERTIONAL ACTIVITY      hydrocortisone 2.5 % cream Apply topically 2 times daily.  28 g 11    ketoconazole (NIZORAL) 2 % cream Apply to affected area BID 30 g 11    ketoconazole (NIZORAL) 2 % shampoo Apply 3-4 times weekly to scalp, leave on for five minutes prior to washing off 120 mL 2    Carbidopa-Levodopa ER (RYTARY) 23.75-95 MG CPCR Take 1 capsule by mouth 3 times daily      Carbidopa-Levodopa ER (RYTARY) 36. MG CPCR Take 1 capsule by mouth 3 times daily      rasagiline (AZILECT) 0.5 MG TABS Take 1 mg by mouth daily      amantadine (SYMMETREL) 100 MG capsule Take 200 mg by mouth 2 times daily      ibuprofen (ADVIL;MOTRIN) 200 MG CAPS 2 tablets as needed       No current facility-administered medications for this visit. Allergies:    Patient has no known allergies. Social History:   Social History     Socioeconomic History    Marital status:      Spouse name: Not on file    Number of children: Not on file    Years of education: Not on file    Highest education level: Not on file   Occupational History    Not on file   Tobacco Use    Smoking status: Never Smoker    Smokeless tobacco: Former User   Substance and Sexual Activity    Alcohol use: Yes     Alcohol/week: 0.0 standard drinks     Comment: social    Drug use: No    Sexual activity: Yes     Partners: Female   Other Topics Concern    Not on file   Social History Narrative    Not on file     Social Determinants of Health     Financial Resource Strain:     Difficulty of Paying Living Expenses: Not on file   Food Insecurity:     Worried About Running Out of Food in the Last Year: Not on file    Roxy of Food in the Last Year: Not on file   Transportation Needs:     Lack of Transportation (Medical): Not on file    Lack of Transportation (Non-Medical):  Not on file   Physical Activity:     Days of Exercise per Week: Not on file    Minutes of Exercise per Session: Not on file   Stress:     Feeling of Stress : Not on file   Social Connections:     Frequency of Communication with Friends and Family: Not on file    Frequency of Social Gatherings with Friends and Family: Not on file    Attends Restoration Services: Not on file    Active Member of Clubs or Organizations: Not on file    Attends Club or Organization Meetings: Not on file    Marital Status: Not on file   Intimate Partner Violence:     Fear of Current or Ex-Partner: Not on file    Emotionally Abused: Not on file    Physically Abused: Not on file    Sexually Abused: Not on file   Housing Stability:     Unable to Pay for Housing in the Last Year: Not on file    Number of [place] in the Last Year: Not on file    Unstable Housing in the Last Year: Not on file       Family History:  Family History   Problem Relation Age of Onset    Other Mother     Heart Disease Father          REVIEW OF SYSTEMS:  Review of Systems    Gen: no fever, chills, malaise  CV: no chest pain or palpitations  Resp: no cough or shortness of breath  GI: no nausea, vomiting, diarrhea, or constipation  Neuro: no seizures, vertigo, or headaches  Msk: HPI  10 remaining systems reviewed and negative      PHYSICAL EXAM:  Ht 6' (1.829 m)   Wt 180 lb (81.6 kg)   BMI 24.41 kg/m² Body mass index is 24.41 kg/m². Physical Exam  Gen: alert and oriented, no acute distress  Psych: Appropriate affect; Appropriate knowledge base; Appropriate mood; No hallucinations  Head: normocephalic atraumatic   Chest: symmetric chest excursion  Ortho Exam  MSK: Left knee: 0 to 140 degrees range of motion. Stable to varus and valgus stress. Stable with Lachman. Eric's test negative. Pain at medial lateral joint lines. Some pain with patellofemoral compression. With a normal gait. No noticeable effusion. Radiology:   History: Left knee pain    Comparison: Previous films    Findings: Multiple views of the left knee in a skeletally mature patient showing early signs of osteoarthritis with small osteophytic spurring and slight decrease in joint space.     Impression: Mild left knee arthritis       ASSESSMENT:  63 y.o. male with left knee arthritis    PLAN:  -Prescription provided for anti-inflammatories  -Discussed injection at future appointment if pain continues  -Follow-up and 6 weeks if pain is ongoing, call in 2 weeks if NSAIDs do not help improve pain. Return in about 6 weeks (around 6/9/2022). Orders Placed This Encounter   Medications    diclofenac (VOLTAREN) 75 MG EC tablet     Sig: Take 1 tablet by mouth 2 times daily     Dispense:  60 tablet     Refill:  0       No orders of the defined types were placed in this encounter. Electronically signed by Elen Shen MD PGY-2 on 4/28/2022 at 8:59 AM    This note is created with the assistance of a speech recognition program.  While intending to generate a document that actually reflects the content of the visit, the document can still have some errors including those of syntax and sound a like substitutions which may escape proof reading.   In such instances, actual meaning can be extrapolated by contextual diversion

## 2022-06-03 RX ORDER — DICLOFENAC SODIUM 75 MG/1
TABLET, DELAYED RELEASE ORAL
Qty: 60 TABLET | Refills: 0 | Status: SHIPPED | OUTPATIENT
Start: 2022-06-03 | End: 2022-09-19

## 2022-07-14 RX ORDER — DICLOFENAC SODIUM 75 MG/1
TABLET, DELAYED RELEASE ORAL
Qty: 60 TABLET | Refills: 0 | OUTPATIENT
Start: 2022-07-14

## 2022-07-15 RX ORDER — DICLOFENAC SODIUM 75 MG/1
TABLET, DELAYED RELEASE ORAL
Qty: 60 TABLET | Refills: 0 | OUTPATIENT
Start: 2022-07-15

## 2022-08-18 SDOH — HEALTH STABILITY: PHYSICAL HEALTH: ON AVERAGE, HOW MANY DAYS PER WEEK DO YOU ENGAGE IN MODERATE TO STRENUOUS EXERCISE (LIKE A BRISK WALK)?: 5 DAYS

## 2022-08-18 SDOH — HEALTH STABILITY: PHYSICAL HEALTH: ON AVERAGE, HOW MANY MINUTES DO YOU ENGAGE IN EXERCISE AT THIS LEVEL?: 60 MIN

## 2022-08-18 ASSESSMENT — SOCIAL DETERMINANTS OF HEALTH (SDOH)

## 2022-08-19 ENCOUNTER — OFFICE VISIT (OUTPATIENT)
Dept: ORTHOPEDIC SURGERY | Age: 62
End: 2022-08-19
Payer: COMMERCIAL

## 2022-08-19 VITALS — RESPIRATION RATE: 14 BRPM | HEIGHT: 72 IN | BODY MASS INDEX: 24.38 KG/M2 | WEIGHT: 180 LBS

## 2022-08-19 DIAGNOSIS — M25.552 LEFT HIP PAIN: Primary | ICD-10-CM

## 2022-08-19 PROCEDURE — 99213 OFFICE O/P EST LOW 20 MIN: CPT | Performed by: ORTHOPAEDIC SURGERY

## 2022-08-19 RX ORDER — PANTOPRAZOLE SODIUM 20 MG/1
1 TABLET, DELAYED RELEASE ORAL DAILY
COMMUNITY
Start: 2022-08-01

## 2022-08-19 NOTE — PROGRESS NOTES
Jacinto Carballo M.D.            Replaced by [facility] [address] [facility],[address], [address] [facility]           Dept Phone: [phone]           Dept Fax:  [address]           Melanie No          Dept Phone: [phone]           Dept Fax:  [phone]      Chief Compliant:  Chief Complaint   Patient presents with    Pain     Left knee and hip pain x3 months        History of Present Illness: This is a 63 y.o. male who presents to the clinic today for evaluation / follow up of left leg pain. Patient has been seen in the past by [address] [place] [address] at [address] [facility]. He has had knee injections in the past.  Patient's complaint however has pain going down his entire leg. He states it starts in his buttock and goes all the way down to the anterior aspect of his ankle on the left side only. He states that he can stand for certain amount of time he can walk surmount time and then he has to sit down. He denies any injury or trauma. .       Review of Systems   Constitutional: Negative for fever, chills, sweats. Eyes: Negative for changes in vision, or pain. HENT: Negative for ear ache, epistaxis, or sore throat. Respiratory/Cardio: Negative for Chest pain, palpitations, SOB, or cough. Gastrointestinal: Negative for abdominal pain, N/V/D. Genitourinary: Negative for dysuria, frequency, urgency, or hematuria. Neurological: Negative for headache, numbness, or weakness. Integumentary: Negative for rash, itching, laceration, or abrasion. Musculoskeletal: Positive for Pain (Left knee and hip pain x3 months)       Physical Exam:  Constitutional: Patient is oriented to person, place, and time. Patient appears well-developed and well nourished.    HENT: Negative otherwise noted  Head: Normocephalic and Atraumatic  Nose: Normal  Eyes: Conjunctivae and EOM are normal  Neck: Normal ACTIVITY, Disp: , Rfl:     hydrocortisone 2.5 % cream, Apply topically 2 times daily. , Disp: 28 g, Rfl: 11    ketoconazole (NIZORAL) 2 % cream, Apply to affected area BID, Disp: 30 g, Rfl: 11    ketoconazole (NIZORAL) 2 % shampoo, Apply 3-4 times weekly to scalp, leave on for five minutes prior to washing off, Disp: 120 mL, Rfl: 2    Carbidopa-Levodopa ER 23.75-95 MG CPCR, Take 1 capsule by mouth 3 times daily, Disp: , Rfl:     Carbidopa-Levodopa ER 36. MG CPCR, Take 1 capsule by mouth 3 times daily, Disp: , Rfl:     rasagiline (AZILECT) 0.5 MG TABS, Take 1 mg by mouth daily, Disp: , Rfl:     amantadine (SYMMETREL) 100 MG capsule, Take 200 mg by mouth 2 times daily, Disp: , Rfl:     ibuprofen (ADVIL;MOTRIN) 200 MG CAPS, 2 tablets as needed, Disp: , Rfl:   No Known Allergies  Social History     Socioeconomic History    Marital status:      Spouse name: Not on file    Number of children: Not on file    Years of education: Not on file    Highest education level: Not on file   Occupational History    Not on file   Tobacco Use    Smoking status: Never    Smokeless tobacco: Former   Substance and Sexual Activity    Alcohol use: Yes     Alcohol/week: 0.0 standard drinks     Comment: social    Drug use: No    Sexual activity: Yes     Partners: Female   Other Topics Concern    Not on file   Social History Narrative    Not on file     Social Determinants of Health     Financial Resource Strain: Not on file   Food Insecurity: Not on file   Transportation Needs: Not on file   Physical Activity: Sufficiently Active    Days of Exercise per Week: 5 days    Minutes of Exercise per Session: 60 min   Stress: Not on file   Social Connections: Not on file   Intimate Partner Violence: Not At Risk    Fear of Current or Ex-Partner: No    Emotionally Abused: No    Physically Abused: No    Sexually Abused: No   Housing Stability: Not on file     No past medical history on file.   Past Surgical History:   Procedure Laterality Date    HERNIA REPAIR      VASECTOMY       Family History   Problem Relation Age of Onset    Other Mother     Heart Disease Father           Provider Attestation:  I, Corina Alanis, personally performed the services described in this documentation. All medical record entries made by the scribe were at my direction and in my presence. I have reviewed the chart and discharge instructions and agree that the records reflect my personal performance and is accurate and complete. Corina Alanis MD. 08/19/22      Please note that this chart was generated using voice recognition Dragon dictation software. Although every effort was made to ensure the accuracy of this automated transcription, some errors in transcription may have occurred.       [facility]) Orthop

## 2022-08-29 ENCOUNTER — TELEPHONE (OUTPATIENT)
Dept: ORTHOPEDIC SURGERY | Age: 62
End: 2022-08-29

## 2022-08-29 DIAGNOSIS — M25.552 LEFT HIP PAIN: Primary | ICD-10-CM

## 2022-08-29 DIAGNOSIS — M25.562 ACUTE PAIN OF LEFT KNEE: ICD-10-CM

## 2022-08-29 NOTE — TELEPHONE ENCOUNTER
Patient would like the referral for back pain to be sent to;  Dr. Aron Vyas  Ph. [phone]  Fax. [phone]    Thank you

## 2022-08-31 NOTE — TELEPHONE ENCOUNTER
patient was seen 8/19/22 for left hip pain. Ok with referral being sent for external ortho for back?

## 2022-09-19 RX ORDER — DICLOFENAC SODIUM 75 MG/1
TABLET, DELAYED RELEASE ORAL
Qty: 60 TABLET | Refills: 0 | Status: SHIPPED | OUTPATIENT
Start: 2022-09-19

## 2023-01-13 ENCOUNTER — HOSPITAL ENCOUNTER (EMERGENCY)
Facility: CLINIC | Age: 63
Discharge: HOME | End: 2023-01-13
Attending: SPECIALIST
Payer: COMMERCIAL

## 2023-01-13 VITALS
BODY MASS INDEX: 23.7 KG/M2 | OXYGEN SATURATION: 99 % | TEMPERATURE: 98 F | DIASTOLIC BLOOD PRESSURE: 81 MMHG | SYSTOLIC BLOOD PRESSURE: 131 MMHG | HEART RATE: 72 BPM | WEIGHT: 175 LBS | HEIGHT: 72 IN | RESPIRATION RATE: 18 BRPM

## 2023-01-13 DIAGNOSIS — S61.213A LACERATION OF LEFT MIDDLE FINGER WITHOUT FOREIGN BODY, NAIL DAMAGE STATUS UNSPECIFIED, INITIAL ENCOUNTER: Primary | ICD-10-CM

## 2023-01-13 PROCEDURE — 6370000000 HC RX 637 (ALT 250 FOR IP): Performed by: SPECIALIST

## 2023-01-13 PROCEDURE — 12002 RPR S/N/AX/GEN/TRNK2.6-7.5CM: CPT

## 2023-01-13 PROCEDURE — 99283 EMERGENCY DEPT VISIT LOW MDM: CPT

## 2023-01-13 PROCEDURE — 2500000003 HC RX 250 WO HCPCS: Performed by: SPECIALIST

## 2023-01-13 RX ORDER — LIDOCAINE HYDROCHLORIDE 10 MG/ML
20 INJECTION, SOLUTION INFILTRATION; PERINEURAL ONCE
Status: COMPLETED | OUTPATIENT
Start: 2023-01-13 | End: 2023-01-13

## 2023-01-13 RX ORDER — GINSENG 100 MG
CAPSULE ORAL ONCE
Status: COMPLETED | OUTPATIENT
Start: 2023-01-13 | End: 2023-01-13

## 2023-01-13 RX ADMIN — BACITRACIN: 500 OINTMENT TOPICAL at 22:29

## 2023-01-13 RX ADMIN — LIDOCAINE HYDROCHLORIDE 20 ML: 10 INJECTION, SOLUTION INFILTRATION; PERINEURAL at 22:00

## 2023-01-13 ASSESSMENT — PAIN - FUNCTIONAL ASSESSMENT: PAIN_FUNCTIONAL_ASSESSMENT: 0-10

## 2023-01-13 ASSESSMENT — PAIN DESCRIPTION - PAIN TYPE: TYPE: ACUTE PAIN

## 2023-01-13 ASSESSMENT — PAIN DESCRIPTION - DESCRIPTORS: DESCRIPTORS: BURNING

## 2023-01-13 ASSESSMENT — PAIN DESCRIPTION - ORIENTATION: ORIENTATION: LEFT

## 2023-01-13 ASSESSMENT — PAIN SCALES - GENERAL: PAINLEVEL_OUTOF10: 1

## 2023-01-13 ASSESSMENT — PAIN DESCRIPTION - ONSET: ONSET: SUDDEN

## 2023-01-14 NOTE — ED NOTES
Pt. Ambulatory to room # 12 from waiting area with his wife, gait steady. Pt. C/o cutting left 3rd finger with a utility knife while working on a project for his grandson. Pt. Has bandage on finger and bleeding controlled. Pt. Alert and oriented x4. RR equal and non labored. Nad noted. Call light within reach.       Kate Andre RN  01/13/23 [id number]

## 2023-01-14 NOTE — ED PROVIDER NOTES
[facility]  [facility]  Phone: [phone]      Pt Name: Celestine Zhang  MRN: [id number]  [place] 1960  Date of evaluation: 1/13/2023      CHIEF COMPLAINT       Chief Complaint   Patient presents with    Laceration     Cut with utility knife left 3rd finger. HISTORY OF PRESENT ILLNESS    Celestine Zhang is a 63 y.o. male who presents   Chief Complaint   Patient presents with    Laceration     Cut with utility knife left 3rd finger. .    72-year-old male patient presents to the emergency department for evaluation of laceration on the dorsal aspect of the left middle finger in the terminal phalangeal area involving the skin surface as well as nail plate. Nailbed is not involved. The laceration occurred at about 8:45 PM prior to arrival when patient was building up play barn cutting the wood with a utility knife and accidentally sustained laceration with a utility knife. Patient is right-hand dominant and denies any previous injuries to the left hand. No history of diabetes mellitus. Patient denies taking any blood thinners. Last tetanus injection was in 2017. Bleeding has been controlled with localized pressure. Patient denies any other injuries. Patient is non-smoker. There are no exacerbating or relieving factors and patient has not taken any medications for the pain prior to arrival.    REVIEW OF SYSTEMS     Review of Systems   Skin:  Positive for wound. Neurological:  Negative for weakness and numbness. All other systems reviewed and are negative. PAST MEDICAL HISTORY    has no past medical history on file. SURGICAL HISTORY      has a past surgical history that includes Vasectomy and Hernia repair.     CURRENT MEDICATIONS       Discharge Medication List as of 1/13/2023 10:20 PM        CONTINUE these medications which have NOT CHANGED    Details   !! RASAGILINE MESYLATE PO Take by mouthHistorical Med      pantoprazole (PROTONIX) 20 MG tablet Take 1 tablet by mouth dailyHistorical Med      carbidopa-levodopa (SINEMET)  MG per tablet TAKE 1 TABLET IN THE MORNING AND ONE-HALF (1/2) TABLET OR ONE TABLET AS NEEDED PRIOR TO EXERTIONAL ACTIVITYHistorical Med      hydrocortisone 2.5 % cream Apply topically 2 times daily. , Disp-28 g, R-11, Normal      ketoconazole (NIZORAL) 2 % shampoo Apply 3-4 times weekly to scalp, leave on for five minutes prior to washing off, Disp-120 mL, R-2, Normal      Carbidopa-Levodopa ER 23.75-95 MG CPCR Take 1 capsule by mouth 3 times dailyHistorical Med      Carbidopa-Levodopa ER 36. MG CPCR Take 1 capsule by mouth 3 times dailyHistorical Med      !! rasagiline (AZILECT) 0.5 MG TABS Take 1 mg by mouth dailyHistorical Med      amantadine (SYMMETREL) 100 MG capsule Take 200 mg by mouth 2 times dailyHistorical Med      ibuprofen (ADVIL;MOTRIN) 200 MG CAPS 2 tablets as neededHistorical Med       !! - Potential duplicate medications found. Please discuss with provider. ALLERGIES     has No Known Allergies. FAMILY HISTORY     He indicated that his mother is . He indicated that his father is . He indicated that both of his sisters are alive. He indicated that both of his brothers are alive. He indicated that his maternal grandmother is . He indicated that his maternal grandfather is . He indicated that his paternal grandmother is . He indicated that his paternal grandfather is . family history includes Heart Disease in his father; Other in his mother. SOCIAL HISTORY      reports that he has never smoked. He has quit using smokeless tobacco. He reports current alcohol use. He reports that he does not use drugs. PHYSICAL EXAM     INITIAL VITALS:  height is 6' (1.829 m) and weight is 79.4 kg (175 lb). His oral temperature is 98 °F (36.7 °C). His blood pressure is 131/81 and his pulse is 72. His respiration is 18 and oxygen saturation is 99%. Physical Exam  Vitals and nursing note reviewed. Constitutional:       General: He is not in acute distress. Appearance: He is well-developed. HENT:      Head: Normocephalic and atraumatic. Nose: Nose normal.   Eyes:      Extraocular Movements: Extraocular movements intact. Pupils: Pupils are equal, round, and reactive to light. Cardiovascular:      Rate and Rhythm: Normal rate and regular rhythm. Heart sounds: Normal heart sounds. No murmur heard. Pulmonary:      Effort: Pulmonary effort is normal. No respiratory distress. Breath sounds: Normal breath sounds. Abdominal:      General: Bowel sounds are normal. There is no distension. Palpations: Abdomen is soft. Tenderness: There is no abdominal tenderness. Musculoskeletal:      Right hand: Laceration present. Cervical back: Normal range of motion and neck supple. Comments: There is approximately 3 cm vertical laceration on the dorsal aspect of the left middle finger in the terminal phalangeal area. Nail plate is involved but there is no involvement of the nailbed. No evidence of subungual hematoma. No evidence of foreign body, tendon or nerve involvement. Neurovascular examination is intact distally. There is full range of movements of the left middle finger. The interphalangeal joints are not penetrated. Skin:     General: Skin is warm and dry. Neurological:      General: No focal deficit present. Mental Status: He is alert and oriented to person, place, and time. Psychiatric:         Behavior: Behavior normal.         Thought Content: Thought content normal.         DIFFERENTIAL DIAGNOSIS/ MDM:     Differential diagnosis considered: Laceration left middle finger involving the skin surface as well as nail plate. Will suture repair on the skin surface and applied Dermabond on the nail plate Under ring block.     DIAGNOSTIC RESULTS     EKG: All EKG's are interpreted by the Emergency Department Physician who either signs or Co-signs this chart in the absence of a cardiologist.    None obtained    RADIOLOGY:   I reviewed the radiologist interpretations:  No orders to display       No results found. LABS:  Labs Reviewed - No data to display      EMERGENCY DEPARTMENT COURSE:   Vitals:    Vitals:    01/13/23 2115 01/13/23 2116   BP: 131/81 131/81   Pulse: 72    Resp: 18    Temp: 98 °F (36.7 °C)    TempSrc: Oral    SpO2: 99%    Weight: 79.4 kg (175 lb)    Height: 6' (1.829 m)      -------------------------  BP: 131/81, Temp: 98 °F (36.7 °C), Heart Rate: 72, Resp: 18    Orders Placed This Encounter   Medications    lidocaine 1 % injection 20 mL    bacitracin ointment       During the emergency department course, laceration was suture repaired as well as Dermabond applied on the left middle finger by myself. Bacitracin ointment and dressing was applied per nursing staff. Wound care instructions were given. Patient was advised to watch carefully for any signs of infection, follow-up with PCP in 2 days for wound recheck in 10 days for suture removal, take Tylenol and/or ibuprofen as needed for the pain, return if worse. I have reviewed the disposition diagnosis with the patient and or their family/guardian. I have answered their questions and given discharge instructions. They voiced understanding of these instructions and did not have any further questions or complaints. Re-evaluation Notes    Patient is feeling much better and resting comfortably. PROCEDURES:  Laceration Repair Procedure Note    Indication: Laceration    Procedure: The patient was placed in the appropriate position and anesthesia around the laceration was obtained with a full digital block of the left long (middle) finger using 1% Lidocaine without epinephrine. The area was then cleansed with betadine and draped in a sterile fashion.  The laceration was closed with 4-0 Prolene using interrupted sutures and Dermabond. There were no additional lacerations requiring repair. The wound area was then dressed with bacitracin and a sterile dressing. Total repaired wound length: 3 cm. Other Items: Suture count: 4    The patient tolerated the procedure well. Complications: None      FINAL IMPRESSION      1. Laceration of left middle finger without foreign body, nail damage status unspecified, initial encounter          DISPOSITION/PLAN   DISPOSITION Decision To Discharge 01/13/2023 10:19:06 PM      Condition on Disposition    Stable    PATIENT REFERRED TO:  L[address]TE [address] [address]  [phone]    Call in 2 days  For wound re-check    LISETTE [address]  [phone]    Call in 10 days  For suture removal    [facility]  [address]  [phone]    If symptoms worsen      DISCHARGE MEDICATIONS:  Discharge Medication List as of 1/13/2023 10:20 PM          (Please note that portions of this note were completed with a voice recognition program.  Efforts were made to edit the dictations but occasionally words are mis-transcribed.)    Tima Daigle MD,, MD, F.A.C.E.P.   Attending Emergency Physician       Tima Daigle MD  01/14/23 0769

## 2023-01-14 NOTE — DISCHARGE INSTRUCTIONS
PLEASE RETURN TO THE EMERGENCY DEPARTMENT IMMEDIATELY if your symptoms worsen in anyway. You should immediately return to the ER for symptoms such as increasing pain, fever, drainage from the wound, warmth or redness around the cut, increasing swelling, numbness or weakness to the extremity, color change or coldness to the extremity    Take your medication as indicated and prescribed. If you are given an antibiotic then, make sure you get the prescription filled and take the antibiotics until finished. It is advised that you keep your wound clean and dry. You may apply antibiotic ointment to the area. Please understand that at this time there is no evidence for a more serious underlying process, but that early in the process of an illness or injury, an emergency department workup can be falsely reassuring. You should contact your family doctor within the next 48 hours for a follow up appointment. Leroy Chavez!!!    From [facility] ([facility]) and [facility]    On behalf of the Emergency Department staff at [facility]), I would like to thank you for giving us the opportunity to address your health care needs and concerns. We hope that during your visit, our service was delivered in a professional and caring manner. Please keep [facility] ([facility]) in mind as we walk with you down the path to your own personal wellness. Please expect an automated text message or email from us so we can ask a few questions about your health and progress. Based on your answers, a clinician may call you back to offer help and instructions. Please understand that early in the process of an illness or injury, an emergency department workup can be falsely reassuring. If you notice any worsening, changing or persistent symptoms please call your family doctor or return to the ER immediately. Tell us how we did during your visit at [email or web address]   and let us know about your experience

## 2023-01-14 NOTE — ED NOTES
Bacitracin applied to left 3rd finger. Adaptic applied and small kerlex. Pt. Tolerated well.      Rex Claudio RN  01/13/23 [id number]

## 2025-05-05 ENCOUNTER — OFFICE VISIT (OUTPATIENT)
Age: 65
End: 2025-05-05

## 2025-05-05 VITALS
HEART RATE: 72 BPM | OXYGEN SATURATION: 98 % | DIASTOLIC BLOOD PRESSURE: 62 MMHG | HEIGHT: 72 IN | BODY MASS INDEX: 25.73 KG/M2 | TEMPERATURE: 98.6 F | WEIGHT: 190 LBS | SYSTOLIC BLOOD PRESSURE: 123 MMHG

## 2025-05-05 DIAGNOSIS — L57.8 ACTINIC SKIN DAMAGE: ICD-10-CM

## 2025-05-05 DIAGNOSIS — D22.9 MULTIPLE NEVI: ICD-10-CM

## 2025-05-05 DIAGNOSIS — L21.9 SEBORRHEIC DERMATITIS: ICD-10-CM

## 2025-05-05 DIAGNOSIS — L57.0 ACTINIC KERATOSES: Primary | ICD-10-CM

## 2025-05-05 RX ORDER — HYDROCORTISONE 25 MG/G
CREAM TOPICAL
Qty: 28 G | Refills: 2 | Status: SHIPPED | OUTPATIENT
Start: 2025-05-05

## 2025-05-05 RX ORDER — KETOCONAZOLE 20 MG/G
CREAM TOPICAL
Qty: 30 G | Refills: 2 | Status: SHIPPED | OUTPATIENT
Start: 2025-05-05

## 2025-05-05 RX ORDER — KETOCONAZOLE 20 MG/ML
SHAMPOO, SUSPENSION TOPICAL
Qty: 120 ML | Refills: 2 | Status: SHIPPED | OUTPATIENT
Start: 2025-05-05

## 2025-05-05 NOTE — PROGRESS NOTES
Dermatology Patient Note  [facility]  [facility] DERMATOLOGY  [address] [place] [address]  Dept: [phone]  Dept Fax: [phone]      VISITDATE: 5/5/2025   REFERRING PROVIDER: No ref. provider found      Erwin Lambert is a 64 y.o. male  who presents today in the office for:    Other (Patient presents in the office today as a 1 year follow up for a FBSE- history of AK's. No history of skin cancer. Patient has not been in the office since 2021/)      HISTORY OF PRESENT ILLNESS:  As above. Patient presents for a 1 year FBSC. He was last seen in office 2/4/21. At this time, 1 ISK on the left chest was treated with cryotherapy. He was also started on ketoconazole cream, ketoconazole shampoo, and hydrocortisone cream for sebderm. For intertrigo, he was started on hydrocortisone and ketoconazole cream.     Today, patient states he has a lesion of concern on his right cheek. Otherwise doing well. He has used ketoconazole shampoo in the past.     MEDICAL PROBLEMS:  Patient Active Problem List    Diagnosis Date Noted    Hyperglycemia 11/30/2018    B12 deficiency 11/30/2018    Tremor 04/30/2018    Pain of left hand 01/11/2016    Hyperlipidemia 12/04/2015    Colon polyp 12/29/2014       CURRENT MEDICATIONS:   Current Outpatient Medications   Medication Sig Dispense Refill    RASAGILINE MESYLATE PO Take by mouth      pantoprazole (PROTONIX) 20 MG tablet Take 1 tablet by mouth daily      carbidopa-levodopa (SINEMET)  MG per tablet TAKE 1 TABLET IN THE MORNING AND ONE-HALF (1/2) TABLET OR ONE TABLET AS NEEDED PRIOR TO EXERTIONAL ACTIVITY      hydrocortisone 2.5 % cream Apply topically 2 times daily. 28 g 11    ketoconazole (NIZORAL) 2 % shampoo Apply 3-4 times weekly to scalp, leave on for five minutes prior to washing off 120 mL 2    Carbidopa-Levodopa ER 23.75-95 MG CPCR Take 1 capsule by mouth 3 times daily      Carbidopa-Levodopa ER 36. MG CPCR Take 1

## 2025-05-05 NOTE — PATIENT INSTRUCTIONS
Seborrheic dermatitis  - restart hydrocortisone 2.5 % cream; Apply topically 2 times daily.    - restart ketoconazole (NIZORAL) 2 % cream; Apply to affected area BID    - restart ketoconazole 2% shampoo to scalp 3-4 weekly, leave in 5 min prior to rinsing     Cryotherapy    Liquid Nitrogen - \"freeze\" (Cryotherapy)  Your doctor has treated your skin lesions with a very cold substance.  The liquid nitrogen is so cold that it may feel like the skin is burning during application.  A clear blister or blood blister may form after treatment and may later form a scab.  Leave the area alone.  Usually this scab will fall of within 1-2 weeks.  The area should be kept clean and can be covered with Vaseline and a Band-Aid if needed. If a large blister develops it is ok to use a clean needle to gently pop the blister. Please call our office with any concerns at [phone].    Sun Protection     There are two types of sun rays that are harmful to the skin.  UVA rays cause skin aging and skin cancer, such as melanoma.  UVB rays cause sunburns, cataracts, and also contribute to skin cancer.    The American-Academy of Dermatology recommends that children and adults wear a broad spectrum, waterproof sunscreen with a Sun Protection Factor (SPF) of 30 or higher.  It is important to check the ingredient label to be sure the sunscreen will protect the skin from both UVA and UVB sunrays.  Your sunscreen should contain at least one of the following ingredients: titanium dioxide, zinc oxide, or avobenzone.    Sunscreen will not be effective unless it is applied to all exposed skin.  Sunscreens work best if they are applied 30 minutes before sun exposure.  They should be reapplied every 2 hours and after any water exposure.    Sunscreen is not perfect.  It is important to use other methods to protect the skin from sun exposure also.  Wear hats, sunglasses and other sun protective clothing when outdoors.  Stay in the shade during the peak